# Patient Record
Sex: FEMALE | Race: WHITE | NOT HISPANIC OR LATINO | Employment: FULL TIME | URBAN - METROPOLITAN AREA
[De-identification: names, ages, dates, MRNs, and addresses within clinical notes are randomized per-mention and may not be internally consistent; named-entity substitution may affect disease eponyms.]

---

## 2017-03-02 ENCOUNTER — OFFICE VISIT (OUTPATIENT)
Dept: RHEUMATOLOGY | Facility: CLINIC | Age: 47
End: 2017-03-02
Payer: OTHER GOVERNMENT

## 2017-03-02 VITALS
HEART RATE: 72 BPM | HEIGHT: 64 IN | DIASTOLIC BLOOD PRESSURE: 80 MMHG | WEIGHT: 283.31 LBS | BODY MASS INDEX: 48.37 KG/M2 | SYSTOLIC BLOOD PRESSURE: 120 MMHG

## 2017-03-02 DIAGNOSIS — M99.79 NARROWING OF INTERVERTEBRAL DISC SPACE: ICD-10-CM

## 2017-03-02 DIAGNOSIS — M79.7 FIBROMYALGIA: Primary | ICD-10-CM

## 2017-03-02 DIAGNOSIS — M48.00 SPINAL STENOSIS, UNSPECIFIED SPINAL REGION: ICD-10-CM

## 2017-03-02 DIAGNOSIS — Z98.84 HISTORY OF BARIATRIC SURGERY: ICD-10-CM

## 2017-03-02 PROCEDURE — 99213 OFFICE O/P EST LOW 20 MIN: CPT | Mod: PBBFAC,PO | Performed by: INTERNAL MEDICINE

## 2017-03-02 PROCEDURE — 99999 PR PBB SHADOW E&M-EST. PATIENT-LVL III: CPT | Mod: PBBFAC,,, | Performed by: INTERNAL MEDICINE

## 2017-03-02 PROCEDURE — 99214 OFFICE O/P EST MOD 30 MIN: CPT | Mod: S$PBB,,, | Performed by: INTERNAL MEDICINE

## 2017-03-02 RX ORDER — DULOXETIN HYDROCHLORIDE 30 MG/1
CAPSULE, DELAYED RELEASE ORAL
Qty: 90 CAPSULE | Refills: 3 | Status: SHIPPED | OUTPATIENT
Start: 2017-03-02 | End: 2017-03-21

## 2017-03-02 ASSESSMENT — ROUTINE ASSESSMENT OF PATIENT INDEX DATA (RAPID3)
PAIN SCORE: 4
PSYCHOLOGICAL DISTRESS SCORE: 4.4
PATIENT GLOBAL ASSESSMENT SCORE: 3
TOTAL RAPID3 SCORE: 2.33
MDHAQ FUNCTION SCORE: 0

## 2017-03-02 NOTE — MR AVS SNAPSHOT
[facility] Rheumatology  [address] [facility] [address]  [facility] [address]  Phone: [phone]  Fax: [phone]                  Selena Hansen   3/2/2017 10:00 AM   Office Visit    Description:  Female : 1970   Provider:  Janee Marcos DO   Department:  [place] - Rheumatology           Reason for Visit     Disease Management           Diagnoses this Visit        Comments    Fibromyalgia    -  Primary     History of bariatric surgery         Narrowing of intervertebral disc space         Spinal stenosis, unspecified spinal region                To Do List           Future Appointments        Provider Department Dept Phone    2017 2:00 PM Janee Marcos DO [facility] Rheumatology [phone]      Goals (5 Years of Data)     None      Follow-Up and Disposition     Return in about 6 months (around 2017).       These Medications        Disp Refills Start End    duloxetine (CYMBALTA) 30 MG capsule 90 capsule 3 3/2/2017     One tab po daily    Pharmacy: [id number] - [address] #: [phone]         [facility]     [facility] Care [facility] -  Assistance  Registered nurses in the [facility] provide clinical advisement, health education, appointment booking, and other advisory services.  Call for this free service at [phone].             Medications           Message regarding Medications     Verify the changes and/or additions to your medication regime listed below are the same as discussed with your clinician today.  If any of these changes or additions are incorrect, please notify your healthcare provider.        CHANGE how you are taking these medications     Start Taking Instead of    duloxetine (CYMBALTA) 30 MG capsule duloxetine (CYMBALTA) 30 MG capsule    Dosage:  One tab po daily Dosage:  Patient to take one tablet in AM in addition to Cymbatla 60mg.    Reason for Change:  Reorder       STOP taking  these medications     gabapentin (NEURONTIN) 600 MG tablet TAKE ONE TABLET BY MOUTH TWICE DAILY.    lisinopril-hydrochlorothiazide (PRINZIDE,ZESTORETIC) 20-25 mg Tab Take 1 tablet by mouth once daily.    omega-3 fatty acids-vitamin E 1,000 mg Cap Take 2 capsules by mouth once daily.           Verify that the below list of medications is an accurate representation of the medications you are currently taking.  If none reported, the list may be blank. If incorrect, please contact your healthcare provider. Carry this list with you in case of emergency.           Current Medications     calcium citrate (CALCITRATE) 200 mg (950 mg) tablet Take 1 tablet by mouth 2 (two) times daily.    cetirizine (ZYRTEC) 10 MG tablet TAKE 1 TABLET DAILY    cholecalciferol, vitamin D3, 2,000 unit Tab Take 1 tablet (2,000 Units total) by mouth once daily.    duloxetine (CYMBALTA) 30 MG capsule One tab po daily    Lactobacillus acidophilus (PROBIOTIC) 10 billion cell Cap Take 1 capsule by mouth once daily.    pantoprazole (PROTONIX) 40 MG tablet Take 1 tablet (40 mg total) by mouth once daily.    tizanidine (ZANAFLEX) 6 mg capsule TAKE 1 CAPSULE THREE TIMES A DAY    clonazePAM (KLONOPIN) 1 MG tablet Take 1 tablet (1 mg total) by mouth daily as needed for Anxiety.           Clinical Reference Information           Your Vitals Were     BP                   120/80           Blood Pressure          Most Recent Value    BP  120/80      Allergies as of 3/2/2017     Hydromorphone Hcl    Dilaudid  [Hydromorphone]    Lipitor [Atorvastatin]    Neurontin [Gabapentin]    Pravachol [Pravastatin]    Zocor [Simvastatin]      Immunizations Administered on Date of Encounter - 3/2/2017     None      [facility] Sign-Up     Activating your [facility] account is as easy as 1-2-3!     1) Visit [email or web address], select Sign Up Now, enter this activation code and your date of birth, then select Next.  [email or web address]  Expires: 4/16/2017 11:08 AM      2) Create a  username and password to use when you visit [facility] in the future and select a security question in case you lose your password and select Next.    3) Enter your e-mail address and click Sign Up!    Additional Information  If you have questions, please e-mail [email or web address] or call [phone] to talk to our [employer] staff. Remember, [facility] is NOT to be used for urgent needs. For medical emergencies, dial 911.         Language Assistance Services     ATTENTION: Language assistance services are available, free of charge. Please call [phone].      ATENCIÓN: Si habla español, tiene a haynes disposición servicios gratuitos de asistencia lingüística. Llame al [phone].     CHÚ Ý: N?u b?n nói Ti?ng Vi?t, có các d?ch v? h? tr? ngôn ng? mi?n phí dành cho b?n. G?i s? [phone].         [facility] complies with applicable Federal civil rights laws and does not discriminate on the basis of race, color, national origin, age, disability, or sex.

## 2017-03-02 NOTE — PROGRESS NOTES
"Subjective:          Chief Complaint: Selena Hansen is a 46 y.o. female who had concerns including Disease Management.    HPI:    Dr. Patino with laminectomy next week.     Here for f/u of FMS/arthralgias. Did very well initally with Cymbalta but has lost some efficacy last few months. Still having insomnia. Failed multple medications in the past. .The problem has been present for 4 years. Symptoms include pain, morning stiffness, lasting from 45 minutes, limited range of motion. Onset was gradual and progressive. Painful joints are hips, back, shoulder and overall sensitivity to light touch with myalgias. The symptoms are of severe severity and 9 on a scale of 0-10. They are made worse by: movement, overuse, resting, sitting and walking. Patient has non-restorative sleep pattern, no snoring  confirms today. Cannot get comfortable. Hx of DJD lumbar spine s/p 2 surgeries and laser surgery with persistent LBP. She is extremely fatigued, continues to work and does enjoy her work but "takes everything out of me" She has been tried on Wellburtrin (this was incorrectly stated effexor in initial note)  at 300mg, Lyrica 150mg BID, failed gabapentin due to ASE.  and xanax and continues with insomnia. Currently on Phentermine x 1 weeks but she has had sleep problems for years. She They are helped somewhat by current meds but all treatments seem to stop working after few months or 1 year. Associated symptoms include: arthralgia and fatigue, foggy thought. Patient denies associated alopecia, bleeding/clotting problems, fevers, muscle weakness, nodules, oral ulcers, pleurisy, rashes/photosensitive and Raynaud's, anemia.   Limitation on activities include: difficulty with focus at work, avocational activity and exercise. Rheumatology Family Hx: no family hx related to rheumatology noted.    REVIEW OF SYSTEMS:    Review of Systems   Constitutional: Negative for fever, malaise/fatigue and weight loss.   HENT: " Negative for sore throat.    Eyes: Negative for double vision, photophobia and redness.   Respiratory: Negative for cough, shortness of breath and wheezing.    Cardiovascular: Negative for chest pain, palpitations and orthopnea.   Gastrointestinal: Negative for abdominal pain, constipation and diarrhea.   Genitourinary: Negative for dysuria, hematuria and urgency.   Musculoskeletal: Positive for back pain and joint pain. Negative for myalgias.   Skin: Negative for rash.   Neurological: Negative for dizziness, tingling, focal weakness and headaches.   Endo/Heme/Allergies: Does not bruise/bleed easily.   Psychiatric/Behavioral: Negative for depression, hallucinations and suicidal ideas.               Objective:            Past Medical History:   Diagnosis Date    b Hypertension     1/6/17 Resolved With Weight Loss After Her 11/2016 Bariatric SX    c Hyperlipidemia With Mild Hypertriglyceridemia     She Cannot Tolerate Statins; 12/4/15 RXd Lifestyle Changes And OTC FO 2K Daily    d Hyperglycemia With Strong Family H/O DM     Improved With Weight Loss    h Uterine Cancer S/P ROSE MARY 2005     i 1/2 PPD X 26 YR TUD     On PRN Xanax And Increased Wellbutrin To 300 Mg Daily 10/16/15    i Flu-Like Symptoms 2/2/17     i Mild Asthma     On Daily Zyrtec    j Family H/O Colon Cancer     j GERD     9/2/16 Flouroscopic UGIS = Entirely Normal With No Hiatal Hernia Or Active Reflux Seen    j H/O Colon Polyps     Has TCs Every 3 Years    j H/O Gastric Sleeve Bariatric SX On 11/16/16 For Morbid Obesity     Dr. Anoop Busch; 12/29/15 RXd Phentermine X 4 Months Failed To Help; 10/2015 RXd Lifestyle Changes; 9/2/16 TSH And Cortisol = Normal     l Family H/O Rheumatoid Arthritis     11/29/15 RF = Normal    l Generalized Arthralgias     1/8/16 RXd Lyrica; 1ST OV With Janee Marcos Will Be 3/10/16; 12/21/15 RXd Neurontin After Her 12/14/15 Lyrica RX Failed To Help; Prednisone Did Nothing; 10/16/15 Changed Celebrex To Mobic    l  Lumbar DDD And Spinal Stenosis S/P SX X 3     Dr. Estefany Sutherland (Lumbar Orthopedic Surgeon)    l Mildly Elevated ESR     11/29/15 ESR = 30 (<20)    l Right Sided Sciatica 7/15/16     7/15/16 Gave A 80 Mg Kenalog Injection And PRN Zanaflex    l Weakly Positive MARILUZ With 1:40 Titer 12/18/15     1ST OV With Janee Marcos Will Be 3/110/16; 11/29/15 MARILUZ Positive With Sjogren's Anti-SS-B LC = 5.2 (0.0-0.9)    n Anxiety And Depression     RTC In 6 Weeks; 8/19/16 Added Pristiq 50 Mg Daily; On PRN Klonopin; Wellbutrin 300 Mg Daily Was Innefective; 9/2/16 TSH And B12 = Normal    o Allergic rhinosinusitis     On Daily Zyrtec    o Migraines     12/29/15 RXd PRN Imitrex; Fioricet Caused S/Es; Referred Her To See Dr. Praneeth Weber    q Allergic urticaria     On Daily Zyrtec    q Vitamin D Deficiency     9/4/16 RXd OTC D3 5K Daily     Family History   Problem Relation Age of Onset    Cancer Maternal Grandfather      colon    Asthma Father     Diabetes Father     Hyperlipidemia Father     Hypertension Father     Thyroid disease Father     Diabetes Mother     Hyperlipidemia Mother     Hypertension Mother      Social History   Substance Use Topics    Smoking status: Former Smoker     Packs/day: 0.50     Types: Cigarettes     Quit date: 5/15/2016    Smokeless tobacco: None    Alcohol use Yes      Comment: social          Current Outpatient Prescriptions on File Prior to Visit   Medication Sig Dispense Refill    calcium citrate (CALCITRATE) 200 mg (950 mg) tablet Take 1 tablet by mouth 2 (two) times daily.      cetirizine (ZYRTEC) 10 MG tablet TAKE 1 TABLET DAILY 90 tablet 1    cholecalciferol, vitamin D3, 2,000 unit Tab Take 1 tablet (2,000 Units total) by mouth once daily. 90 tablet 3    duloxetine (CYMBALTA) 30 MG capsule Patient to take one tablet in AM in addition to Cymbatla 60mg. (Patient taking differently: Take 30 mg by mouth once daily. Patient to take one tablet in AM in addition to Cymbatla 60mg. )  90 capsule 3    duloxetine (CYMBALTA) 60 MG capsule Take 60 mg by mouth once daily.      Lactobacillus acidophilus (PROBIOTIC) 10 billion cell Cap Take 1 capsule by mouth once daily.      pantoprazole (PROTONIX) 40 MG tablet Take 1 tablet (40 mg total) by mouth once daily. 30 tablet 11    tizanidine (ZANAFLEX) 6 mg capsule TAKE 1 CAPSULE THREE TIMES A DAY 90 capsule 1    clonazePAM (KLONOPIN) 1 MG tablet Take 1 tablet (1 mg total) by mouth daily as needed for Anxiety. 90 tablet 1    [DISCONTINUED] gabapentin (NEURONTIN) 600 MG tablet TAKE ONE TABLET BY MOUTH TWICE DAILY. 60 tablet 5    [DISCONTINUED] lisinopril-hydrochlorothiazide (PRINZIDE,ZESTORETIC) 20-25 mg Tab Take 1 tablet by mouth once daily. 90 tablet 1    [DISCONTINUED] omega-3 fatty acids-vitamin E 1,000 mg Cap Take 2 capsules by mouth once daily.       No current facility-administered medications on file prior to visit.        Vitals:    03/02/17 0947   BP: 120/80   Pulse: 72       Physical Exam:    Physical Exam   Constitutional: She appears well-developed and well-nourished.   obese   HENT:   Nose: No septal deviation.   Mouth/Throat: Mucous membranes are normal. No oral lesions.   Eyes: Pupils are equal, round, and reactive to light. Right conjunctiva is not injected. Left conjunctiva is not injected.   Neck: No JVD present. No thyroid mass and no thyromegaly present.   Cardiovascular: Normal rate, regular rhythm and normal pulses.    No edema   Pulmonary/Chest: Effort normal and breath sounds normal.   Abdominal: Soft. Normal appearance. There is no hepatosplenomegaly.   Musculoskeletal:        Right shoulder: She exhibits normal range of motion, no tenderness and no swelling.        Left shoulder: She exhibits normal range of motion, no tenderness and no swelling.        Right elbow: She exhibits normal range of motion and no swelling. No tenderness found.        Left elbow: She exhibits normal range of motion and no swelling. No tenderness  found.        Right wrist: She exhibits normal range of motion, no tenderness and no swelling.        Left wrist: She exhibits normal range of motion, no tenderness and no swelling.        Right hip: She exhibits normal range of motion, normal strength and no swelling.        Left hip: She exhibits normal range of motion, no tenderness and no swelling.        Right knee: She exhibits normal range of motion and no swelling. No tenderness found.        Left knee: She exhibits normal range of motion and no swelling. No tenderness found.        Right ankle: She exhibits normal range of motion and no swelling. No tenderness.        Left ankle: She exhibits normal range of motion and no swelling. No tenderness.   Few TP in upper trap and greater troch  No synovitis, restriction in ROM, tenderness of wrist, MCP, PIP, DIP. Able to fully curl fingers.  intact.    Lymphadenopathy:     She has no cervical adenopathy.     She has no axillary adenopathy.   Neurological: She has normal strength and normal reflexes.   Skin: Skin is dry and intact.   Psychiatric: She has a normal mood and affect.             Assessment:       Encounter Diagnoses   Name Primary?    Fibromyalgia Yes    H/O Gastric Sleeve Bariatric SX On 11/16/16 For Morbid Obesity     Narrowing of intervertebral disc space     Spinal stenosis, unspecified spinal region           Plan:        Fibromyalgia  -     duloxetine (CYMBALTA) 30 MG capsule; One tab po daily  Dispense: 90 capsule; Refill: 3    H/O Gastric Sleeve Bariatric SX On 11/16/16 For Morbid Obesity    Narrowing of intervertebral disc space    Spinal stenosis, unspecified spinal region    Doing so very well. Will decrease cymbalta to 30 mg daily and if contnuing to exercise we can try to wean entirely off cymbalta as this has disrupted sleep.    Discussed how to wean down and if any problems call can use klonopin x 2 weeks.   Return in about 6 months (around 9/2/2017).          30min consultation  with greater than 50% spent in counseling, chart review and coordination of care. All questions answered.

## 2017-03-20 ENCOUNTER — TELEPHONE (OUTPATIENT)
Dept: RHEUMATOLOGY | Facility: CLINIC | Age: 47
End: 2017-03-20

## 2017-03-20 DIAGNOSIS — M79.7 FIBROMYALGIA: ICD-10-CM

## 2017-03-20 NOTE — TELEPHONE ENCOUNTER
----- Message from Otto Trevizo sent at 3/20/2017  1:01 PM CDT -----  Contact: same  Unsuccessful call placed to pod. Patient called in and stated she was returning a call to nurse from a little earlier.  Patient call back number is [phone]

## 2017-03-20 NOTE — TELEPHONE ENCOUNTER
Called patient and spoke with her  Jm, she was taking a nap. Left phone number to call us back about question with Cymbalta. He voiced understanding.

## 2017-03-20 NOTE — TELEPHONE ENCOUNTER
Spoke with patient. Patient is requesting at least 60 Cymbalta, the 30 isn't enough. She is taking only 60mg in the morning. The weaning off of this med caused her a lot of joint pain. So she was taking 90 mg and then was trying 30 mg but it was just not enough. [facility]. She states Dr. Marcos knows all about her getting off of this med. So she needs new RX for 60mg daily only in the am. Please advise. Thank you.

## 2017-03-20 NOTE — TELEPHONE ENCOUNTER
----- Message from Karen Bowman sent at 3/17/2017  2:05 PM CDT -----  Contact: self [phone]   Patient is requesting a call back from the nurse stated she have to take at least 60 Cymbalta, the 30 isn't enough.    Please call the patient upon request at phone number [phone].

## 2017-03-21 RX ORDER — DULOXETIN HYDROCHLORIDE 60 MG/1
60 CAPSULE, DELAYED RELEASE ORAL DAILY
Qty: 90 CAPSULE | Refills: 3 | Status: SHIPPED | OUTPATIENT
Start: 2017-03-21 | End: 2017-06-19

## 2017-04-12 ENCOUNTER — TELEPHONE (OUTPATIENT)
Dept: NEUROSURGERY | Facility: CLINIC | Age: 47
End: 2017-04-12

## 2017-04-12 NOTE — TELEPHONE ENCOUNTER
Pt called wanting to schedule an appt with Dr. Jensen for a second opinion. Pt has [facility], informed pt we require a referral before scheduling an appt. Pt states she sees her PCP tomorrow and will ask for a referral. Advised pt we will contact her once we received the referral. Pt verbalized understanding.

## 2017-05-04 RX ORDER — DULOXETIN HYDROCHLORIDE 60 MG/1
CAPSULE, DELAYED RELEASE ORAL
Qty: 90 CAPSULE | Refills: 2 | Status: SHIPPED | OUTPATIENT
Start: 2017-05-04 | End: 2018-04-11 | Stop reason: SDUPTHER

## 2017-08-10 ENCOUNTER — TELEPHONE (OUTPATIENT)
Dept: RHEUMATOLOGY | Facility: CLINIC | Age: 47
End: 2017-08-10

## 2017-08-10 NOTE — TELEPHONE ENCOUNTER
----- Message from Neha Haro sent at 8/10/2017 12:27 PM CDT -----  Contact: pt cell [phone]  Patient called and asked if you will call on the Cymbalta for a 90 mg she states the 60 mg is not working. She is asking if you will call  Back on her cell [phone]     Left message that  Cymbalta dose change will be up to the doctor. Patient advised to wait for a call from her pharmacy concerning if a change is made. CG

## 2017-08-14 RX ORDER — DULOXETIN HYDROCHLORIDE 60 MG/1
60 CAPSULE, DELAYED RELEASE ORAL DAILY
Qty: 90 CAPSULE | Refills: 2 | Status: CANCELLED | OUTPATIENT
Start: 2017-08-14

## 2017-08-14 RX ORDER — DULOXETIN HYDROCHLORIDE 30 MG/1
30 CAPSULE, DELAYED RELEASE ORAL DAILY
Qty: 30 CAPSULE | Refills: 11 | Status: SHIPPED | OUTPATIENT
Start: 2017-08-14 | End: 2017-12-29 | Stop reason: SDUPTHER

## 2017-08-14 NOTE — TELEPHONE ENCOUNTER
Correction patient has already been on Cymbalta 90mg just asking for the 30mg tablet rx to be filled.

## 2017-08-14 NOTE — TELEPHONE ENCOUNTER
Patient states that she is already on 60 mg Cymbalta, but is asking for 30 mg to add to this. She states she was on this before for you and got great relief. She has been on only 60 mg for a month now and her pain has been bad enough that she cannot tolerate sheets on her legs or shave her legs. She gets 60 mg from mail order-she is asking for 30 from her [facility] in [place] if you decide to fill. CG

## 2017-08-15 NOTE — TELEPHONE ENCOUNTER
----- Message from Yesica Brizuela sent at 8/15/2017  8:40 AM CDT -----  Contact: duncan  Missed call   Call back   Leave message secure voicemail  Or work  [phone]  Ask for pt     [facility]ication [facility] on VM that Cymbalta 30 called to [facility]. CG

## 2017-08-16 RX ORDER — DULOXETIN HYDROCHLORIDE 30 MG/1
CAPSULE, DELAYED RELEASE ORAL
Qty: 90 CAPSULE | Refills: 2 | Status: SHIPPED | OUTPATIENT
Start: 2017-08-16 | End: 2018-03-04 | Stop reason: SDUPTHER

## 2018-03-06 RX ORDER — DULOXETIN HYDROCHLORIDE 30 MG/1
CAPSULE, DELAYED RELEASE ORAL
Qty: 90 CAPSULE | Refills: 2 | Status: SHIPPED | OUTPATIENT
Start: 2018-03-06 | End: 2018-04-19 | Stop reason: SDUPTHER

## 2018-04-15 RX ORDER — DULOXETIN HYDROCHLORIDE 60 MG/1
60 CAPSULE, DELAYED RELEASE ORAL DAILY
Qty: 90 CAPSULE | Refills: 2 | Status: SHIPPED | OUTPATIENT
Start: 2018-04-15 | End: 2018-04-19 | Stop reason: SDUPTHER

## 2018-04-18 PROBLEM — Z91.199 NO-SHOW FOR APPOINTMENT: Status: ACTIVE | Noted: 2018-04-18

## 2018-04-19 NOTE — TELEPHONE ENCOUNTER
----- Message from Brittany Conde sent at 4/19/2018  8:45 AM CDT -----  Type:  RX Refill Request      Refill :    DULoxetine (CYMBALTA) 60 MG capsule   DULoxetine (CYMBALTA) 30 MG capsule     How is the patient currently taking it?:  60 MG 1 x a day & 30 MG 1 x a day    Is this a 30 day or 90 day RX: 90    Preferred Pharmacy with phone number:     [facility] - [address] [address]  Phone: [phone] Fax: [phone]    Best Call Back Number:  [phone]

## 2018-04-24 RX ORDER — DULOXETIN HYDROCHLORIDE 60 MG/1
60 CAPSULE, DELAYED RELEASE ORAL DAILY
Qty: 90 CAPSULE | Refills: 0 | Status: SHIPPED | OUTPATIENT
Start: 2018-04-24 | End: 2018-07-23

## 2018-04-24 RX ORDER — DULOXETIN HYDROCHLORIDE 30 MG/1
CAPSULE, DELAYED RELEASE ORAL
Qty: 90 CAPSULE | Refills: 0 | Status: SHIPPED | OUTPATIENT
Start: 2018-04-24 | End: 2019-02-01

## 2018-09-21 RX ORDER — DULOXETIN HYDROCHLORIDE 60 MG/1
CAPSULE, DELAYED RELEASE ORAL
Qty: 90 CAPSULE | Refills: 0 | OUTPATIENT
Start: 2018-09-21

## 2019-03-19 PROBLEM — M54.42 ACUTE BILATERAL LOW BACK PAIN WITH LEFT-SIDED SCIATICA: Status: ACTIVE | Noted: 2019-03-19
